# Patient Record
Sex: MALE | Race: WHITE | NOT HISPANIC OR LATINO | Employment: FULL TIME | URBAN - METROPOLITAN AREA
[De-identification: names, ages, dates, MRNs, and addresses within clinical notes are randomized per-mention and may not be internally consistent; named-entity substitution may affect disease eponyms.]

---

## 2020-04-28 ENCOUNTER — OFFICE VISIT (OUTPATIENT)
Dept: ORTHOPEDICS | Facility: CLINIC | Age: 65
End: 2020-04-28
Payer: OTHER MISCELLANEOUS

## 2020-04-28 DIAGNOSIS — M25.571 ACUTE RIGHT ANKLE PAIN: Primary | ICD-10-CM

## 2020-04-28 DIAGNOSIS — S93.491A SPRAIN OF ANTERIOR TALOFIBULAR LIGAMENT OF RIGHT ANKLE, INITIAL ENCOUNTER: ICD-10-CM

## 2020-04-28 PROCEDURE — 99203 OFFICE O/P NEW LOW 30 MIN: CPT | Mod: 25,S$GLB,, | Performed by: ORTHOPAEDIC SURGERY

## 2020-04-28 PROCEDURE — 99203 PR OFFICE/OUTPT VISIT, NEW, LEVL III, 30-44 MIN: ICD-10-PCS | Mod: 25,S$GLB,, | Performed by: ORTHOPAEDIC SURGERY

## 2020-04-28 NOTE — PROGRESS NOTES
Subjective:      Patient ID: Denis Henao is a 64 y.o. male.    Chief Complaint: Pain of the Right Foot    HPI  64-year-old man who injured his right ankle several weeks ago.  The patient is a very poor historian and is uncertain as to the exact date of his injury.  He was apparently seen in a local urgent care and placed in a fracture brace.  He states he has no pain at this time  Review of Systems   Constitution: Negative for fever and weight loss.   Cardiovascular: Negative for chest pain and leg swelling.   Musculoskeletal: Negative for arthritis, joint pain, joint swelling, muscle weakness and stiffness.   Gastrointestinal: Negative for change in bowel habit.   Genitourinary: Negative for bladder incontinence and hematuria.   Neurological: Negative for focal weakness, numbness, paresthesias and sensory change.         Objective:      Active and passive range of motion of the right foot and ankle is normal.  There is no swelling.  He has no tenderness to palpation.  He has a negative anterior drawer test and negative talar tilt test.  He has normal sensation      Ortho/SPM Exam            Assessment:       Encounter Diagnoses   Name Primary?    Sprain of anterior talofibular ligament of right ankle, initial encounter     Acute right ankle pain Yes          Plan:       Denis was seen today for pain.    Diagnoses and all orders for this visit:    Acute right ankle pain  -     X-Ray Ankle 2 View Right; Future    Sprain of anterior talofibular ligament of right ankle, initial encounter      AP and lateral of the right ankle is unremarkable.  Impression is healed ankle sprain.  Return p.r.n.